# Patient Record
Sex: FEMALE | Race: BLACK OR AFRICAN AMERICAN | NOT HISPANIC OR LATINO | URBAN - METROPOLITAN AREA
[De-identification: names, ages, dates, MRNs, and addresses within clinical notes are randomized per-mention and may not be internally consistent; named-entity substitution may affect disease eponyms.]

---

## 2017-06-20 ENCOUNTER — OUTPATIENT (OUTPATIENT)
Dept: OUTPATIENT SERVICES | Facility: HOSPITAL | Age: 52
LOS: 1 days | End: 2017-06-20
Payer: COMMERCIAL

## 2017-06-20 ENCOUNTER — APPOINTMENT (OUTPATIENT)
Dept: MRI IMAGING | Facility: CLINIC | Age: 52
End: 2017-06-20

## 2017-06-20 DIAGNOSIS — Z00.8 ENCOUNTER FOR OTHER GENERAL EXAMINATION: ICD-10-CM

## 2017-06-20 PROCEDURE — 70551 MRI BRAIN STEM W/O DYE: CPT

## 2017-07-21 ENCOUNTER — OUTPATIENT (OUTPATIENT)
Dept: OUTPATIENT SERVICES | Facility: HOSPITAL | Age: 52
LOS: 1 days | End: 2017-07-21
Payer: COMMERCIAL

## 2017-07-21 ENCOUNTER — APPOINTMENT (OUTPATIENT)
Dept: CT IMAGING | Facility: CLINIC | Age: 52
End: 2017-07-21

## 2017-07-21 DIAGNOSIS — J98.4 OTHER DISORDERS OF LUNG: ICD-10-CM

## 2017-07-21 DIAGNOSIS — Z00.8 ENCOUNTER FOR OTHER GENERAL EXAMINATION: ICD-10-CM

## 2017-07-21 PROCEDURE — 71250 CT THORAX DX C-: CPT

## 2019-02-20 ENCOUNTER — APPOINTMENT (OUTPATIENT)
Dept: PULMONOLOGY | Facility: CLINIC | Age: 54
End: 2019-02-20

## 2019-02-20 VITALS
HEART RATE: 70 BPM | OXYGEN SATURATION: 98 % | HEIGHT: 65 IN | RESPIRATION RATE: 21 BRPM | TEMPERATURE: 98.5 F | BODY MASS INDEX: 27.49 KG/M2 | DIASTOLIC BLOOD PRESSURE: 60 MMHG | WEIGHT: 165 LBS | SYSTOLIC BLOOD PRESSURE: 110 MMHG

## 2019-03-19 ENCOUNTER — APPOINTMENT (OUTPATIENT)
Dept: PULMONOLOGY | Facility: CLINIC | Age: 54
End: 2019-03-19
Payer: COMMERCIAL

## 2019-03-19 VITALS
HEART RATE: 66 BPM | HEIGHT: 66 IN | WEIGHT: 165 LBS | SYSTOLIC BLOOD PRESSURE: 102 MMHG | OXYGEN SATURATION: 98 % | DIASTOLIC BLOOD PRESSURE: 64 MMHG | BODY MASS INDEX: 26.52 KG/M2 | TEMPERATURE: 98.2 F

## 2019-03-19 DIAGNOSIS — R79.89 OTHER SPECIFIED ABNORMAL FINDINGS OF BLOOD CHEMISTRY: ICD-10-CM

## 2019-03-19 DIAGNOSIS — R55 SYNCOPE AND COLLAPSE: ICD-10-CM

## 2019-03-19 DIAGNOSIS — R91.1 SOLITARY PULMONARY NODULE: ICD-10-CM

## 2019-03-19 PROCEDURE — 99205 OFFICE O/P NEW HI 60 MIN: CPT

## 2019-03-19 NOTE — PHYSICAL EXAM
[Normal Appearance] : normal appearance [General Appearance - Well Developed] : well developed [General Appearance - Well Nourished] : well nourished [No Deformities] : no deformities [Well Groomed] : well groomed [General Appearance - In No Acute Distress] : no acute distress [Eyelids - No Xanthelasma] : the eyelids demonstrated no xanthelasmas [Normal Conjunctiva] : the conjunctiva exhibited no abnormalities [Neck Appearance] : the appearance of the neck was normal [Normal Oropharynx] : normal oropharynx [Neck Cervical Mass (___cm)] : no neck mass was observed [Jugular Venous Distention Increased] : there was no jugular-venous distention [Thyroid Nodule] : there were no palpable thyroid nodules [Thyroid Diffuse Enlargement] : the thyroid was not enlarged [Heart Sounds] : normal S1 and S2 [Heart Rate And Rhythm] : heart rate and rhythm were normal [Murmurs] : no murmurs present [Respiration, Rhythm And Depth] : normal respiratory rhythm and effort [Auscultation Breath Sounds / Voice Sounds] : lungs were clear to auscultation bilaterally [Exaggerated Use Of Accessory Muscles For Inspiration] : no accessory muscle use [Abdomen Tenderness] : non-tender [Abdomen Soft] : soft [Abdomen Mass (___ Cm)] : no abdominal mass palpated [Nail Clubbing] : no clubbing of the fingernails [Abnormal Walk] : normal gait [Gait - Sufficient For Exercise Testing] : the gait was sufficient for exercise testing [Cyanosis, Localized] : no localized cyanosis [Petechial Hemorrhages (___cm)] : no petechial hemorrhages [Skin Color & Pigmentation] : normal skin color and pigmentation [] : no rash [Skin Turgor] : normal skin turgor [Deep Tendon Reflexes (DTR)] : deep tendon reflexes were 2+ and symmetric [No Focal Deficits] : no focal deficits [Sensation] : the sensory exam was normal to light touch and pinprick [Impaired Insight] : insight and judgment were intact [Oriented To Time, Place, And Person] : oriented to person, place, and time [Affect] : the affect was normal

## 2019-03-19 NOTE — HISTORY OF PRESENT ILLNESS
[FreeTextEntry1] : The patient is a 53-year-old woman born in [place] referred by  further evaluation of her previously abnormal CAT scan of the chest\par \par The patient is a nonsmoker and she has no history of significant pulmonary disease\par \par The patient has been evaluated for recurrent syncope with no clear-cut etiology\par The patient has been seen by cardiology on multiple occasions and was recently provided weighted again. The patient flies on an airplane fairly frequently\par \par Patient also has had retinal detachment of unclear etiology\par \par She has been noted to have an elevated d-dimer although she has never had any definite thromboembolic disease associated with .it. A recent d-dimer was minimally elevated\par \par The patient received BCG at birth in [place]. She has never had tuberculosis or exposure\par \par Current medications include metoprolol 12.5 and Lexapro 20 mg\par \par It was somewhat difficult to try to find out which Scans have been done. The last available CT chest appears to be from 2016 at [facility] and this was reviewed along with the patient in the office\par \par She has a 4 mm nodule in the right lower lobe but no other significant pulmonary abnormalities

## 2019-03-19 NOTE — CONSULT LETTER
[Dear  ___] : Dear  [unfilled], [FreeTextEntry1] : I had the pleasure of evaluating your patient, ARLINE BRISCOE , in the office today.  Please review my consultation and evaluation report that follows below.  Please do not hesitate to call me if further information is necessary or if you wish to discuss ongoing care or diagnostic work-up.   \par I very much appreciate your referral and it is a privilege to be able to provide care for your patient.\par \par Sincerely,\par  \par Raimundo Oconnor MD, MHCM, FACP\par Pulmonary Medicine\par  of Medicine\par [facility] [employer] [facility] [place]\par \par [email or web address]\par Multi-Specialties at [place]\par \par

## 2019-03-19 NOTE — ASSESSMENT
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 If no further change is noted, and these nodules will not have to be followed going forward\par \par The patient also is somewhat concerned about her shortness of breath. On her return visit I will ask her to obtain a full set of pulmonary function although simple spirometry was unremarkable today

## 2019-10-30 ENCOUNTER — APPOINTMENT (OUTPATIENT)
Dept: SURGICAL ONCOLOGY | Facility: CLINIC | Age: 54
End: 2019-10-30
Payer: COMMERCIAL

## 2019-10-30 VITALS
DIASTOLIC BLOOD PRESSURE: 69 MMHG | HEART RATE: 63 BPM | SYSTOLIC BLOOD PRESSURE: 105 MMHG | HEIGHT: 66 IN | BODY MASS INDEX: 26.52 KG/M2 | WEIGHT: 165 LBS

## 2019-10-30 DIAGNOSIS — K82.4 CHOLESTEROLOSIS OF GALLBLADDER: ICD-10-CM

## 2019-10-30 DIAGNOSIS — R73.03 PREDIABETES.: ICD-10-CM

## 2019-10-30 PROCEDURE — 99205 OFFICE O/P NEW HI 60 MIN: CPT

## 2019-10-30 RX ORDER — METOPROLOL TARTRATE 1 MG/ML
INJECTION, SOLUTION INTRAVENOUS
Refills: 0 | Status: ACTIVE | COMMUNITY

## 2019-10-30 NOTE — PHYSICAL EXAM
[Normal] : supple, no neck mass and thyroid not enlarged [Normal Supraclavicular Lymph Nodes] : normal supraclavicular lymph nodes [Normal Axillary Lymph Nodes] : normal axillary lymph nodes [Normal] : oriented to person, place and time, with appropriate affect

## 2019-10-30 NOTE — CONSULT LETTER
[Dear  ___] : Dear  [unfilled], [Consult Letter:] : I had the pleasure of evaluating your patient, [unfilled]. [Consult Closing:] : Thank you very much for allowing me to participate in the care of this patient.  If you have any questions, please do not hesitate to contact me. [Sincerely,] : Sincerely, [DrTimo  ___] : Dr. ESPARZA [FreeTextEntry2] : Jan Amos MD [FreeTextEntry1] : 54 year-old female presents for an initial consultation.  She has been followed for a previously noted gallbladder polyp for at least 5 years.  She has also been under the care of Dr. Jan Amos for blood dyscrasias including neutropenia and anemia.  He sent her for a follow up abdominal ultrasound on 9/15/19 which revealed a 0.4 cm gallbladder polyp along the anterior gallbladder wall without significant change from prior study in 2014. There is interval development of an additional 0.3 cm polyp also along the anterior gallbladder wall.  \par \par Her past medical history includes atypical chest pain and syncope for which she previously underwent an extensive cardiac workup with Dr. Jagdeep Lizama which was negative- she is currently on a low dose beta blocker.   Also has a history of granulomatous lung disease, previously followed by Dr. Raimundo Oconnor.  Given long term stability no additional follow up imaging has been advised.  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   \par \par She denies abdominal pain, fever, chills, night sweats or weight loss.\par \par A&P:\par Given small size of polyps no surgical intervention is indicated.  The majority of gallbladder polyps are benign.  I have asked her to follow up with me in 6 months upon completion of a repeat abdominal ultrasound to assess for ongoing stability. I have reviewed the pertinent imaging, bloodwork and pathology. \par \par PCP: Dr. Vinayak Pabon\par Referring MD/Heme-Onc: Dr. Jan Amos [FreeTextEntry3] : Dario Cunningham MD, FACS, FASCRS\par , Department of Surgery\par Director of the [facility] [place]\par , Minimally Invasive/Robotic Cancer Surgery, Central & Eastern Divisions\par Division of Surgical Oncology

## 2019-10-30 NOTE — ASSESSMENT
[FreeTextEntry1] : 54 year-old female presents for an initial consultation.  She has been followed for a previously noted gallbladder polyp for at least 5 years.  She has also been under the care of Dr. Jan Amos for blood dyscrasias including neutropenia and anemia.  He sent her for a follow up abdominal ultrasound on 9/15/19 which revealed a 0.4 cm gallbladder polyp along the anterior gallbladder wall without significant change from prior study in 2014. There is interval development of an additional 0.3 cm polyp also along the anterior gallbladder wall.  \par \par Her past medical history includes atypical chest pain and syncope for which she previously underwent an extensive cardiac workup with Dr. Jagdeep Lizama which was negative- she is currently on a low dose beta blocker.   Also has a history of granulomatous lung disease, previously followed by Dr. Raimundo Oconnor.  Given long term stability no additional follow up imaging has been advised.  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   \par \par She denies abdominal pain, fever, chills, night sweats or weight loss.\par \par A&P:\par Given small size of polyps no surgical intervention is indicated.  The majority of gallbladder polyps are benign.  I have asked her to follow up with me in 6 months upon completion of a repeat abdominal ultrasound to assess for ongoing stability. I have reviewed the pertinent imaging, bloodwork and pathology.

## 2019-10-30 NOTE — HISTORY OF PRESENT ILLNESS
[de-identified] : 54 year-old female presents for an initial consultation.  She has been followed for a previously noted gallbladder polyp for at least 5 years.  She has also been under the care of Dr. Jan Amos for blood dyscrasias including neutropenia and anemia.  He sent her for a follow up abdominal ultrasound on 9/15/19 which revealed a 0.4 cm gallbladder polyp along the anterior gallbladder wall without significant change from prior study in 2014. There is interval development of an additional 0.3 cm polyp also along the anterior gallbladder wall.  \par \par Her past medical history includes atypical chest pain and syncope for which she previously underwent an extensive cardiac workup with Dr. Jagdeep Lizama which was negative- she is currently on a low dose beta blocker.   Also has a history of granulomatous lung disease, previously followed by Dr. Raimundo Oconnor.  Given long term stability no additional follow up imaging has been advised.  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   \par \par She denies abdominal pain, fever, chills, night sweats or weight loss.\par \par PCP: Dr. Vinayak Pabon\par Referring MD/Heme-Onc: Dr. Jan Amos

## 2020-07-23 ENCOUNTER — RESULT REVIEW (OUTPATIENT)
Age: 55
End: 2020-07-23

## 2020-09-30 ENCOUNTER — APPOINTMENT (OUTPATIENT)
Dept: RHEUMATOLOGY | Facility: CLINIC | Age: 55
End: 2020-09-30

## 2020-10-15 ENCOUNTER — APPOINTMENT (OUTPATIENT)
Dept: RHEUMATOLOGY | Facility: CLINIC | Age: 55
End: 2020-10-15

## 2020-11-16 ENCOUNTER — APPOINTMENT (OUTPATIENT)
Dept: RHEUMATOLOGY | Facility: CLINIC | Age: 55
End: 2020-11-16

## 2023-06-29 ENCOUNTER — NON-APPOINTMENT (OUTPATIENT)
Age: 58
End: 2023-06-29